# Patient Record
Sex: FEMALE | Race: OTHER
[De-identification: names, ages, dates, MRNs, and addresses within clinical notes are randomized per-mention and may not be internally consistent; named-entity substitution may affect disease eponyms.]

---

## 2018-05-09 ENCOUNTER — HOSPITAL ENCOUNTER (EMERGENCY)
Dept: HOSPITAL 72 - EMR | Age: 33
Discharge: HOME | End: 2018-05-09
Payer: MEDICAID

## 2018-05-09 VITALS — DIASTOLIC BLOOD PRESSURE: 75 MMHG | SYSTOLIC BLOOD PRESSURE: 116 MMHG

## 2018-05-09 VITALS — SYSTOLIC BLOOD PRESSURE: 130 MMHG | DIASTOLIC BLOOD PRESSURE: 84 MMHG

## 2018-05-09 VITALS — HEIGHT: 60 IN | BODY MASS INDEX: 27.29 KG/M2 | WEIGHT: 139 LBS

## 2018-05-09 VITALS — DIASTOLIC BLOOD PRESSURE: 75 MMHG | SYSTOLIC BLOOD PRESSURE: 118 MMHG

## 2018-05-09 DIAGNOSIS — N39.0: Primary | ICD-10-CM

## 2018-05-09 LAB
APPEARANCE UR: (no result)
APTT PPP: (no result) S
GLUCOSE UR STRIP-MCNC: NEGATIVE MG/DL
KETONES UR QL STRIP: NEGATIVE
LEUKOCYTE ESTERASE UR QL STRIP: (no result)
NITRITE UR QL STRIP: NEGATIVE
PH UR STRIP: 6.5 [PH] (ref 4.5–8)
PROT UR QL STRIP: NEGATIVE
SP GR UR STRIP: 1.01 (ref 1–1.03)
UROBILINOGEN UR-MCNC: NORMAL MG/DL (ref 0–1)

## 2018-05-09 PROCEDURE — 81003 URINALYSIS AUTO W/O SCOPE: CPT

## 2018-05-09 PROCEDURE — 81025 URINE PREGNANCY TEST: CPT

## 2018-05-09 PROCEDURE — 76856 US EXAM PELVIC COMPLETE: CPT

## 2018-05-09 PROCEDURE — 76830 TRANSVAGINAL US NON-OB: CPT

## 2018-05-09 PROCEDURE — 99284 EMERGENCY DEPT VISIT MOD MDM: CPT

## 2018-05-09 NOTE — DIAGNOSTIC IMAGING REPORT
Indication: Pain. Negative pregnancy test documented in the electronic medical record

on the day of exam.

 

Technique: Transabdominal and endovaginal pelvic ultrasound was performed.

 

Findings: The uterus measures 7.1 x 4.5 x 3.6 cm. Endometrium measures approximately

1 mm in thickness. Within the posterior myometrium there is a heterogeneous but

predominantly hypoechoic lesion that measures approximately 1.3 cm maximal diameter.

This contains a small complex mildly 2 mm of focus of hyperechogenicity with possible

shadowing. This may represent a fibroid with a small calcification. In the cervix

there are well-circumscribed anechoic avascular structures likely representing

nabothian cysts. The largest measures approximately 1.1 cm.

 

Right ovary measures 4 x 2.3 x 2 cm/9.7 mL. The left ovary measures 3.6 x 2.7 x 2.2

cm/11 mL. Small follicles are noted on the right. A simple appearing cyst in the left

ovary measures up to 1.7 cm. Color and Doppler flow to the bilateral ovaries is

documented.

 

No free pelvic fluid identified.

 

IMPRESSION: 

Predominantly hypoechoic lesion in the posterior myometrium with a 2 minute focus of

shadowing hyperechogenicity. Findings likely represent a small fibroid with a small

internal calcification. Clinical correlation recommended.

 

2 simple appearing anechoic avascular structures in the cervix likely representing

nabothian cysts. The largest measures approximately 1.1 cm.

 

Simple appearing left ovarian cyst measuring up to 1.7 cm. No evidence to suggest

ovarian torsion; color and Doppler flow noted to the bilateral ovaries.

 

This corresponds with the preliminary report issued to the emergency department by

the scanning ultrasound technologist.

## 2018-05-09 NOTE — EMERGENCY ROOM REPORT
History of Present Illness


General


Chief Complaint:  Abdominal Pain


Source:  Patient





Present Illness


HPI


Patient is a 32-year-old female who presented after increased left-sided 

abdominal pain.  Patient reports having severe left-sided abdominal cramping 

associated with nausea and diarrhea.  Patient reports having intermittent 

symptoms associated with watery diarrhea.  She denied any black or bloody 

stools.  She had not been having any hematemesis.  She denied any fever.


Allergies:  


Coded Allergies:  


     No Known Allergies (Unverified , 5/9/18)





Patient History


Last Menstrual Period:  5/7/18


Pregnant Now:  No


Reviewed Nursing Documentation:  PMH: Agreed; PSxH: Agreed





Nursing Documentation-PMH


Past Medical History:  No Stated History





Review of Systems


All Other Systems:  negative except mentioned in HPI





Physical Exam





Vital Signs








  Date Time  Temp Pulse Resp B/P (MAP) Pulse Ox O2 Delivery O2 Flow Rate FiO2


 


5/9/18 10:21 98.2 67 18 130/84 100 Room Air  





 98.2       








Sp02 EP Interpretation:  reviewed, normal


General Appearance:  normal inspection, well appearing, no apparent distress, 

alert, GCS 15


Head:  atraumatic


ENT:  normal ENT inspection, hearing grossly normal, normal voice


Neck:  normal inspection, full range of motion, supple, no bony tend


Respiratory:  normal inspection, lungs clear, normal breath sounds, no 

respiratory distress, no retraction, no wheezing


Cardiovascular #1:  regular rate, rhythm, no edema


Gastrointestinal:  normal inspection, normal bowel sounds, non tender, soft, no 

guarding, no hernia


Genitourinary:  no CVA tenderness


Musculoskeletal:  normal inspection, back normal, normal range of motion


Neurologic:  normal inspection, alert, oriented x3, responsive, CNs III-XII nml 

as tested, speech normal


Psychiatric:  normal inspection, judgement/insight normal, mood/affect normal


Skin:  normal inspection, normal color, no rash





Medical Decision Making


Diagnostic Impression:  


 Primary Impression:  


 Urinary tract infection


ER Course


Patient presented for abdominal pain. Differential diagnoses included ischemic 

bowel, appendicitis, perforated viscus, abdominal aortic aneurysm, inferior 

myocardial infarction, viral gastroenteritis


The pelvic ultrasound was ordered due to the patient's location of pain  for 

possible ovarian torsion.  The pelvic ultrasound showed adequate ovarian flow.  

The patient was given oral Bentyl  for what appears to be a viral 

gastroenteritis.  The patient was additionally noted to have some evidence of 

urinary infection the patient was given prescription for Keflex.The patient is 

advised to follow up with  primary care doctor in 1-2 days.  Patient is advised 

to return if any worsening condition or if any changes in status that are 

concerning.





This report is dictated with Dragon transcription software which may 

occasionally lead to discrepancies related to use of this software.





Labs








Test


  5/9/18


10:30


 


Urine Color Pale yellow 


 


Urine Appearance Very cloudy 


 


Urine pH 6.5 (4.5-8.0) 


 


Urine Specific Gravity


  1.010


(1.005-1.035)


 


Urine Protein


  Negative


(NEGATIVE)


 


Urine Glucose (UA)


  Negative


(NEGATIVE)


 


Urine Ketones


  Negative


(NEGATIVE)


 


Urine Occult Blood 2+ (NEGATIVE) 


 


Urine Nitrite


  Negative


(NEGATIVE)


 


Urine Bilirubin


  Negative


(NEGATIVE)


 


Urine Urobilinogen


  Normal MG/DL


(0.0-1.0)


 


Urine Leukocyte Esterase 1+ (NEGATIVE) 


 


Urine RBC


  5-10 /HPF (0 -


2)


 


Urine WBC


  2-4 /HPF (0 -


2)


 


Urine Squamous Epithelial


Cells Moderate /LPF


(NONE/OCC)


 


Urine Bacteria


  Few /HPF


(NONE)


 


Urine HCG, Qualitative


  Negative


(NEGATIVE)











Last Vital Signs








  Date Time  Temp Pulse Resp B/P (MAP) Pulse Ox O2 Delivery O2 Flow Rate FiO2


 


5/9/18 13:21 98.3 67 17 118/75 99 Room Air  





 98.1       








Status:  improved


Disposition:  HOME, SELF-CARE


Condition:  Stable


Scripts


Dicyclomine Hcl* (DICYCLOMINE HCL*) 10 Mg Capsule


10 MG PO QID, #30 CAP


   Prov: Eric Bates         5/9/18 


Cephalexin* (KEFLEX*) 500 Mg Capsule


500 MG ORAL EVERY 6 HOURS, #28 CAP


   Prov: Eric Bates         5/9/18


Patient Instructions:  Abdominal Pain, Adult











Eric Bates May 9, 2018 14:59